# Patient Record
Sex: MALE | Race: WHITE | NOT HISPANIC OR LATINO | Employment: FULL TIME | URBAN - METROPOLITAN AREA
[De-identification: names, ages, dates, MRNs, and addresses within clinical notes are randomized per-mention and may not be internally consistent; named-entity substitution may affect disease eponyms.]

---

## 2021-09-21 ENCOUNTER — TRANSFERRED RECORDS (OUTPATIENT)
Dept: HEALTH INFORMATION MANAGEMENT | Facility: CLINIC | Age: 56
End: 2021-09-21
Payer: COMMERCIAL

## 2022-03-30 ENCOUNTER — MEDICAL CORRESPONDENCE (OUTPATIENT)
Dept: HEALTH INFORMATION MANAGEMENT | Facility: CLINIC | Age: 57
End: 2022-03-30
Payer: COMMERCIAL

## 2022-03-31 ENCOUNTER — TRANSCRIBE ORDERS (OUTPATIENT)
Dept: OTHER | Age: 57
End: 2022-03-31

## 2022-03-31 DIAGNOSIS — G56.32 RADIAL TUNNEL SYNDROME OF LEFT UPPER EXTREMITY: Primary | ICD-10-CM

## 2022-03-31 DIAGNOSIS — R20.2 PARESTHESIA AND PAIN OF EXTREMITY: ICD-10-CM

## 2022-03-31 DIAGNOSIS — M79.609 PARESTHESIA AND PAIN OF EXTREMITY: ICD-10-CM

## 2022-04-01 NOTE — TELEPHONE ENCOUNTER
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   OFFICE NOTE from other specialist Care Everywhere 3.17.22 Raul Lutz [facility]  3.3.22  6.24.21  10.15.21 Deric Urbina [facility]  9.2.21 Mae Beard [facility]  8.6.21 Oralia Zarco [facility]  More in Care Everywhere   OPERATIVE REPORT Care Everywhere 12.18.20   EMG Care Everywhere 9.21.21   MEDICATION LIST Care Everywhere    LABS     MRI PACS 12.2.20 L elbow, [facility]  10.4.17 L shoulder, [facility]   XRAYS (IMAGES & REPORTS) PACS 11.19.20 L elbow, [facility]  6.2.17 L shoulder, [facility]  4.3.09 L shoulder, [facility]     Action 4.1.22 1:08 PM [facility]   Action Taken Faxed request to [facility] for images [phone]

## 2022-05-26 ENCOUNTER — PRE VISIT (OUTPATIENT)
Dept: ORTHOPEDICS | Facility: CLINIC | Age: 57
End: 2022-05-26
Payer: COMMERCIAL

## 2022-05-26 ENCOUNTER — OFFICE VISIT (OUTPATIENT)
Dept: ORTHOPEDICS | Facility: CLINIC | Age: 57
End: 2022-05-26
Payer: OTHER MISCELLANEOUS

## 2022-05-26 DIAGNOSIS — R20.2 PARESTHESIA AND PAIN OF EXTREMITY: ICD-10-CM

## 2022-05-26 DIAGNOSIS — M79.609 PARESTHESIA AND PAIN OF EXTREMITY: ICD-10-CM

## 2022-05-26 DIAGNOSIS — G56.32 RADIAL TUNNEL SYNDROME OF LEFT UPPER EXTREMITY: ICD-10-CM

## 2022-05-26 PROBLEM — G62.9 NEUROPATHY: Status: ACTIVE | Noted: 2017-02-07

## 2022-05-26 PROBLEM — F11.90 OPIOID USE, UNSPECIFIED, UNCOMPLICATED: Status: ACTIVE | Noted: 2022-04-13

## 2022-05-26 PROBLEM — J43.9 PULMONARY EMPHYSEMA, UNSPECIFIED EMPHYSEMA TYPE (H): Status: ACTIVE | Noted: 2018-01-09

## 2022-05-26 PROBLEM — Q33.0: Status: ACTIVE | Noted: 2018-01-16

## 2022-05-26 PROCEDURE — 99204 OFFICE O/P NEW MOD 45 MIN: CPT | Performed by: PHYSICIAN ASSISTANT

## 2022-05-26 RX ORDER — TIOTROPIUM BROMIDE 18 UG/1
1 CAPSULE ORAL; RESPIRATORY (INHALATION)
COMMUNITY
Start: 2021-12-20

## 2022-05-26 RX ORDER — HYDROCODONE BITARTRATE AND ACETAMINOPHEN 5; 325 MG/1; MG/1
TABLET ORAL
COMMUNITY
Start: 2022-05-09

## 2022-05-26 RX ORDER — ALBUTEROL SULFATE 90 UG/1
AEROSOL, METERED RESPIRATORY (INHALATION)
COMMUNITY
Start: 2022-04-01

## 2022-05-26 RX ORDER — ATORVASTATIN CALCIUM 40 MG/1
1 TABLET, FILM COATED ORAL AT BEDTIME
COMMUNITY
Start: 2021-03-10

## 2022-05-26 RX ORDER — DULOXETIN HYDROCHLORIDE 60 MG/1
60 CAPSULE, DELAYED RELEASE ORAL
COMMUNITY
Start: 2021-12-20

## 2022-05-26 RX ORDER — GABAPENTIN 300 MG/1
CAPSULE ORAL
COMMUNITY
Start: 2022-05-12

## 2022-05-26 RX ORDER — TRAZODONE HYDROCHLORIDE 50 MG/1
50-100 TABLET, FILM COATED ORAL
COMMUNITY
Start: 2021-12-20

## 2022-05-26 RX ORDER — CELECOXIB 200 MG/1
200 CAPSULE ORAL
COMMUNITY
Start: 2022-03-21

## 2022-05-26 NOTE — NURSING NOTE
Reason For Visit:   Chief Complaint   Patient presents with     Consult     radial tunnel syndrome of L upper extremity/paresthesia and pain of extremity, nerve entrapment.  Ref. Dr. Oralia Zarco       Primary MD: No primary care provider on file.  Ref. MD: Dr. Oralia Zarco    Age: 56 year old    ?  No      There were no vitals taken for this visit.      Pain Assessment  Patient Currently in Pain: Yes  0-10 Pain Scale: 5  Primary Pain Location: Elbow (Left)  Pain Descriptors: Radiating, Constant, Aching, Other (comment) (sensitivity)  Alleviating Factors: Pain medication (Norco)  Aggravating Factors: Movement, Other (comment) (activity)    Hand Dominance Evaluation  Hand Dominance: Right      force  R hand pincher force: 4.99 kg (11 lb)  R hand  level 2 force: 43.1 kg (95 lb)  L hand pincher force: 7.258 kg (16 lb)  L hand  level  2 force: 36.3 kg (80 lb)    QuickDASH Assessment  No flowsheet data found.       Current Outpatient Medications   Medication Sig Dispense Refill     atorvastatin (LIPITOR) 40 MG tablet Take 1 tablet by mouth At Bedtime       celecoxib (CELEBREX) 200 MG capsule Take 200 mg by mouth       DULoxetine (CYMBALTA) 60 MG capsule Take 60 mg by mouth       gabapentin (NEURONTIN) 300 MG capsule TAKE 3 CAPSULES BY MOUTH ONCE DAILY IN THE MORNING AND 2 AT NOON AND 3 IN THE EVENING       HYDROcodone-acetaminophen (NORCO) 5-325 MG tablet TAKE 1 TABLET BY MOUTH EVERY 8 HOURS AS NEEDED FOR PAIN. LIMIT ACETAMINOPHEN TO 4000MG DAILY FROM ALL SOURCES.       tiotropium (SPIRIVA HANDIHALER) 18 MCG inhaled capsule Inhale 1 capsule into the lungs       tiZANidine (ZANAFLEX) 4 MG tablet Take 2-4 mg by mouth       traZODone (DESYREL) 50 MG tablet Take  mg by mouth       VENTOLIN  (90 Base) MCG/ACT inhaler INHALE 1 TO 2 PUFFS BY MOUTH EVERY 4 HOURS AS NEEDED FOR SHORTNESS OF BREATH. SHAKE BEFORE USING.         No Known Allergies    Sirisha Cowan ATC

## 2022-05-26 NOTE — NURSING NOTE
Teaching Flowsheet   Relevant Diagnosis: Left radial tunnel syndrome  Teaching Topic: Left radial tunnel release    CSC under MAC with [facility] with Dr Keyonna Carl  Work comp case.  Patient will call to register information, number provided for patient to register this information.    Needs MRI.  Will get closer to home in [place]. Order provided for this. Provided fax # for results and asked for that facility to push images/report here if possible.  Imaging scheduling number for [place] given if he would want to come back to the [facility].  Patient needs to let us know when completed and Mariah MUELLER will review results.    Mariah MUELLER will enter case request after MRI received and discussed with patient.     Person(s) involved in teaching:   Patient     Motivation Level:  Asks Questions: Yes  Eager to Learn: Yes  Cooperative: Yes  Receptive (willing/able to accept information): Yes  Any cultural factors/Sikh beliefs that may influence understanding or compliance? No    Patient demonstrates understanding of the following:  Reason for the appointment, diagnosis and treatment plan: Yes  Knowledge of proper use of medications and conditions for which they are ordered (with special attention to potential side effects or drug interactions): Yes  Which situations necessitate calling provider and whom to contact: Yes    Teaching Concerns Addressed:   Proper use and care of  (medical equip, care aids, etc.): Yes  Nutritional needs and diet plan: Yes  Pain management techniques: Yes  Wound Care: Yes  How and/when to access community resources: Yes     Instructional Materials Used/Given:   Preoperative surgery packet, antibacterial Chlorhexidine soap. Stop Light Tool reviewed, patient verbalized understanding, had no immediate questions. Khadijah Shelton RN

## 2022-05-26 NOTE — LETTER
5/26/2022         RE: Nish Nazario  [address] [address]        Dear Colleague,    Thank you for referring your patient, Nish Nazario, to the [facility] [place]. Please see a copy of my visit note below.    Chief Complaint:   Chief Complaint   Patient presents with     Consult     radial tunnel syndrome of L upper extremity/paresthesia and pain of extremity, nerve entrapment.  Ref. Dr. Oralia Zarco       History of Present Illness:   Nish Nazario is a 56 year old, right handed male who presents today for evaluation of left arm paresthesias and elbow pain.  Patient states that he underwent a left distal biceps repair in December 2020.  He states that almost immediately following this he developed a deep achiness over the dorsal proximal aspect of his forearm.  He states this is remained constant over the last 2-1/2 years.  He has started to develop hypersensitivity and shooting zapping pains that radiate down the dorsal aspect of his forearm and the his fingers.  He denies any numbness or tingling.  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    Patient reports that he has done many months of physical therapy with minimal relief.  He states he underwent a corticosteroid injection in the area of the radial tunnel last month with no improvement in his symptoms.  He states that his pain is a constant 5/10.  Sometimes it keeps him up at night.  He has not started taking hydrocodone, 1 to 2 tablets daily for this pain.    The patient reports that his biceps injury was a work comp injury.     Medications:   Noncontributory    Allergies:  The patient has no known allergies.     Past Medical History:  Noncontributory    Past Surgical History:  Noncontributory    Social History:  Works as a . He admits to tobacco use (10-15  cigarettes/day) and denies alcohol use.      Family History:  Noncontributory    Review of Systems: A 14-point review of systems was obtained on intake and reviewed.     Physical examination:  The patient is well-developed, well nourished and in no acute distress. The patient is alert and oriented to the surroundings. Behavior is appropriate to the surroundings. Extra-ocular motions are intact. Respirations appear unlabored.     Examination of the left upper extremity reveals skin to be clean, dry and intact.  Well-healed surgical incision over the volar aspect of the proximal forearm.  Scar is mobile.  Tenderness to palpation around the area of the surgical incision as well as the dorsal proximal forearm in the area of the radial tunnel.  Hypersensitivity to light touch throughout the dorsal forearm into the dorsal hand and fingers.  Strength 5/5 without pain in wrist flexors, wrist extensors, pronation, supination, IO,  strength.  Nontender to palpation throughout the wrist and hand.  Negative Tinel's at the carpal tunnel, negative carpal compression test, negative Tinel's at the cubital tunnel, negative cubital tunnel compression test.  Sensation intact to light touch in the median, radial, and ulnar nerve distributions. 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     Assessment:   56-year-old male with:  1.  Left elbow and forearm pain consistent with possible radial tunnel syndrome.  Symptoms developed after distal biceps tendon repair in December 2020 after a work-related injury of distal biceps tendon rupture.  2.  Tobacco use.     Plan:     Discussed with the  patient treatment options for possible radial tunnel syndrome.  We discussed the ideology of the syndrome and suspicion related to scar tissue or compression postoperatively following distal biceps tendon repair.  We discussed treatment options including continued conservative management versus surgical intervention in the form of a radial nerve decompression.  We discussed that this procedure in general has been a approximately 50% chance of making his symptoms better. We also discussed risks of surgery including infection, bleeding, pain, scarring, damage to nerves, blood vessels, or other nearby structures, failure to improve, continued discomfort, need for further surgery, and risks of anesthesia. The patient expresses understanding and wishes to proceed with surgery.  Would like to obtain an MRI of the left elbow to further evaluate the radial nerve and areas of possible compression.  Preoperative teaching done by clinic nurse today.  Our surgery scheduler contact the patient for scheduling.  The patient is agreement this plan.    SAGAR SOUTH PA-C  5/26/2022 6:19 PM   Orthopaedic Surgery     I have personally examined this patient and have reviewed the clinical presentation and progress note with the PA. I agree with the treatment plan as outlined. The plan was formulated with the PAt on the day of the PA's dictation.      Keyonna Carl MD   Hand and Upper Extremity Specialist  [facility]      Scribe Disclosure:  I, Seferino Sorto, a scribe, prepared the chart for today's encounter.         Again, thank you for allowing me to participate in the care of your patient.        Sincerely,        Keyonna Carl MD

## 2022-05-26 NOTE — PROGRESS NOTES
Chief Complaint:   Chief Complaint   Patient presents with     Consult     radial tunnel syndrome of L upper extremity/paresthesia and pain of extremity, nerve entrapment.  Ref. Dr. Oralia Zarco       History of Present Illness:   Nish Nazario is a 56 year old, right handed male who presents today for evaluation of left arm paresthesias and elbow pain.  Patient states that he underwent a left distal biceps repair in December 2020.  He states that almost immediately following this he developed a deep achiness over the dorsal proximal aspect of his forearm.  He states this is remained constant over the last 2-1/2 years.  He has started to develop hypersensitivity and shooting zapping pains that radiate down the dorsal aspect of his forearm and the his fingers.  He denies any numbness or tingling.  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    Patient reports that he has done many months of physical therapy with minimal relief.  He states he underwent a corticosteroid injection in the area of the radial tunnel last month with no improvement in his symptoms.  He states that his pain is a constant 5/10.  Sometimes it keeps him up at night.  He has not started taking hydrocodone, 1 to 2 tablets daily for this pain.    The patient reports that his biceps injury was a work comp injury.     Medications:   Noncontributory    Allergies:  The patient has no known allergies.     Past Medical History:  Noncontributory    Past Surgical History:  Noncontributory    Social History:  Works as a . He admits to tobacco use (10-15 cigarettes/day) and denies alcohol use.      Family History:  Noncontributory    Review of Systems: A 14-point review of systems was obtained on intake and reviewed.     Physical examination:  The patient is well-developed, well nourished and in no acute distress. The  patient is alert and oriented to the surroundings. Behavior is appropriate to the surroundings. Extra-ocular motions are intact. Respirations appear unlabored.     Examination of the left upper extremity reveals skin to be clean, dry and intact.  Well-healed surgical incision over the volar aspect of the proximal forearm.  Scar is mobile.  Tenderness to palpation around the area of the surgical incision as well as the dorsal proximal forearm in the area of the radial tunnel.  Hypersensitivity to light touch throughout the dorsal forearm into the dorsal hand and fingers.  Strength 5/5 without pain in wrist flexors, wrist extensors, pronation, supination, IO,  strength.  Nontender to palpation throughout the wrist and hand.  Negative Tinel's at the carpal tunnel, negative carpal compression test, negative Tinel's at the cubital tunnel, negative cubital tunnel compression test.  Sensation intact to light touch in the median, radial, and ulnar nerve distributions. 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     Assessment:   56-year-old male with:  1.  Left elbow and forearm pain consistent with possible radial tunnel syndrome.  Symptoms developed after distal biceps tendon repair in December 2020 after a work-related injury of distal biceps tendon rupture.  2.  Tobacco use.     Plan:     Discussed with the patient treatment options for possible radial tunnel syndrome.  We discussed the ideology of the syndrome and suspicion related to scar tissue or compression postoperatively following distal biceps tendon repair.  We discussed treatment options including continued  conservative management versus surgical intervention in the form of a radial nerve decompression.  We discussed that this procedure in general has been a approximately 50% chance of making his symptoms better. We also discussed risks of surgery including infection, bleeding, pain, scarring, damage to nerves, blood vessels, or other nearby structures, failure to improve, continued discomfort, need for further surgery, and risks of anesthesia. The patient expresses understanding and wishes to proceed with surgery.  Would like to obtain an MRI of the left elbow to further evaluate the radial nerve and areas of possible compression.  Preoperative teaching done by clinic nurse today.  Our surgery scheduler contact the patient for scheduling.  The patient is agreement this plan.    SAGAR SOUTH PA-C  5/26/2022 6:19 PM   Orthopaedic Surgery     I have personally examined this patient and have reviewed the clinical presentation and progress note with the PA. I agree with the treatment plan as outlined. The plan was formulated with the PAt on the day of the PA's dictation.      Keyonna Carl MD   Hand and Upper Extremity Specialist  [facility]      Scribe Disclosure:  I, Seferino Sorto, a scribe, prepared the chart for today's encounter.

## 2022-05-26 NOTE — LETTER
Date:Ilana 10, 2022      Provider requested that no letter be sent. Do not send.       [facility]

## 2022-06-24 ENCOUNTER — TELEPHONE (OUTPATIENT)
Dept: ORTHOPEDICS | Facility: CLINIC | Age: 57
End: 2022-06-24

## 2022-06-24 DIAGNOSIS — M79.609 PARESTHESIA AND PAIN OF EXTREMITY: Primary | ICD-10-CM

## 2022-06-24 DIAGNOSIS — R20.2 PARESTHESIA AND PAIN OF EXTREMITY: Primary | ICD-10-CM

## 2022-06-24 DIAGNOSIS — M65.90 TENOSYNOVITIS: ICD-10-CM

## 2022-06-24 NOTE — TELEPHONE ENCOUNTER
Assessment on 5-26-22 with Dr Carl/ Mariah MUELLER for possible radial tunnel syndrome of LUE.   MRI results are completed, he had this done in [facility] in [place], [place] on 6-19-22.      MRI images and attached report in PACs.  Office visit nursing notes 5-26-22 state that Mariah Logan will review results, discuss with patient and then enter case request afterwards.     Patient would like a call back when convenient.  Khadijah Shelton RN

## 2022-06-27 NOTE — TELEPHONE ENCOUNTER
Mariah MUELLER received response from Dr Carl.   She stated that patient has inflammation at the bicep at the site of his repair.  It is recommended for the next step that he has an ultrasound guided cortisone injection at the site of the surgical repair.  Sports med non-op provider.    Patient informed.  He lives in [place] and would like to try to go do this through [facility], attn: Dr Raul Lutz.  If they do not do the injection there he will come to the [facility] orthopedics sports medicine clinic.     Patient requests referral to be sent to Dr Lutz's office.  Phone [phone]  Order Faxed to  [phone] Khadijah Shelton RN

## 2022-07-07 PROBLEM — R20.2 PARESTHESIA AND PAIN OF EXTREMITY: Status: ACTIVE | Noted: 2022-07-07

## 2022-07-07 PROBLEM — M79.609 PARESTHESIA AND PAIN OF EXTREMITY: Status: ACTIVE | Noted: 2022-07-07

## 2022-07-07 PROBLEM — G56.32 RADIAL TUNNEL SYNDROME OF LEFT UPPER EXTREMITY: Status: ACTIVE | Noted: 2022-07-07

## 2022-07-11 ENCOUNTER — TELEPHONE (OUTPATIENT)
Dept: ORTHOPEDICS | Facility: CLINIC | Age: 57
End: 2022-07-11

## 2022-07-11 NOTE — TELEPHONE ENCOUNTER
Nish had his ultrasound guided steroid injection at the site of his left bicep tendon repair last Wednesday 7-6-22 at [facility] in [place].  He had almost immediate relief, and pain was much improved until today when the pain returned and seems worse than it did before the injection.  He denies fever, redness, swelling or drainage at the site of the injection.  He denies feeling any sudden injury.     He has been using his daily Hydrocodone 5-325, Gabapentin, Cymbalta and Celebrex 200 mg every day.    Denies any change in daily activities even though his arm had been feeling better this week.     He will rest, ice, elevate.  He has ice on now.  He will try his muscle relaxer at bedtime tonight. He will continue with current medications.  He will go to ER or urgent care if pain continues to increase.      He is wondering what would be the next step.  He might try to see the Orthopedic MD in [place] on Thursday to make sure he doesn't have any infection from the injection.     He has no more scheduled visits here with Dr Carl.     Message sent to Dr Carl for any recommendations. Khadijah Shelton, KARTIK

## 2022-07-13 NOTE — TELEPHONE ENCOUNTER
"Dr Carl responded via email 7-12-22:    Per Dr Carl, \"The injection was into the repair site of the biceps.  Because this gave complete pain relief (albeit short-lived), this confirms that the repair site itself is the source of the pain, and not the posterior interosseous nerve.    --   Two options:  1. Next available follow up with me.  2. Return to surgeon that performed the biceps repair and inform him/her that the repair site itself is the source of pain (not the Posterior interosseous nerve).\"    7-13-22 10:02 AM  Left voice mail for patient to return call to RN. Khadijah Shelton RN    "

## 2022-07-15 NOTE — TELEPHONE ENCOUNTER
1330  Friday 7-15-22. Patient answered RN call.  Informed of provider response and recommendation.      Patient is still having some pain at tendon repair site but not as severe. He thinks that his injection is still helping somewhat.  He has more pain in his wrist and shoulder now than before, but he thinks it's because the injection diminished his tendon repair site pain so much that he notices the other pains more now.  He thinks he might have carpal tunnel but was informed his EMG did not confirm this.  He thinks he is going to follow up with the MD who did his rotator cuff shoulder repair surgery for the current shoulder pain.     He would like to see his surgeon who did the bicep repair first, and he will contact RN if he needs to make a follow up with Dr Carl.  Khadijah Shelton RN

## 2022-08-01 ENCOUNTER — TELEPHONE (OUTPATIENT)
Dept: ORTHOPEDICS | Facility: CLINIC | Age: 57
End: 2022-08-01

## 2022-08-01 ENCOUNTER — TELEPHONE (OUTPATIENT)
Dept: ORTHOPEDICS | Facility: CLINIC | Age: 57
End: 2022-08-01
Payer: COMMERCIAL

## 2022-08-01 DIAGNOSIS — Z53.9 ERRONEOUS ENCOUNTER--DISREGARD: Primary | ICD-10-CM
